# Patient Record
Sex: MALE | URBAN - METROPOLITAN AREA
[De-identification: names, ages, dates, MRNs, and addresses within clinical notes are randomized per-mention and may not be internally consistent; named-entity substitution may affect disease eponyms.]

---

## 2022-07-20 ENCOUNTER — APPOINTMENT (OUTPATIENT)
Dept: URBAN - METROPOLITAN AREA CLINIC 273 | Age: 22
Setting detail: DERMATOLOGY
End: 2022-07-20

## 2022-07-20 DIAGNOSIS — M71 OTHER BURSOPATHIES: ICD-10-CM

## 2022-07-20 PROBLEM — M71.342 OTHER BURSAL CYST, LEFT HAND: Status: ACTIVE | Noted: 2022-07-20

## 2022-07-20 PROCEDURE — OTHER DEFER: OTHER

## 2022-07-20 PROCEDURE — OTHER MIPS QUALITY: OTHER

## 2022-07-20 PROCEDURE — 99202 OFFICE O/P NEW SF 15 MIN: CPT

## 2022-07-20 PROCEDURE — OTHER ADDITIONAL NOTES: OTHER

## 2022-07-20 PROCEDURE — OTHER COUNSELING: OTHER

## 2022-07-20 ASSESSMENT — LOCATION ZONE DERM: LOCATION ZONE: FINGER

## 2022-07-20 ASSESSMENT — LOCATION SIMPLE DESCRIPTION DERM: LOCATION SIMPLE: LEFT INDEX FINGER

## 2022-07-20 ASSESSMENT — LOCATION DETAILED DESCRIPTION DERM: LOCATION DETAILED: LEFT INDEX PROXIMAL INTERPHALANGEAL JOINT

## 2022-07-20 NOTE — PROCEDURE: DEFER
Introduction Text (Please End With A Colon): Requires hand surgeon because cyst sits on joint:
Detail Level: Detailed
Reason To Defer Override: see hand surgeon Dr Donis MOYER

## 2022-07-20 NOTE — PROCEDURE: MIPS QUALITY
Detail Level: Detailed
Quality 226: Preventive Care And Screening: Tobacco Use: Screening And Cessation Intervention: Patient screened for tobacco use, is a smoker AND received Cessation Counseling within the Previous 12 Months
Quality 431: Preventive Care And Screening: Unhealthy Alcohol Use - Screening: Patient not identified as an unhealthy alcohol user when screened for unhealthy alcohol use using a systematic screening method

## 2022-07-20 NOTE — PROCEDURE: ADDITIONAL NOTES
Detail Level: Simple
Additional Notes: No erythema with lesion today. Attempted drainage with 16g sterile needle but unable to drain. Pt advised to call ortho office for further evaluation and removal if desired
Render Risk Assessment In Note?: no

## 2022-07-20 NOTE — HPI: CYST
How Severe Is Your Cyst?: moderate
Is This A New Presentation, Or A Follow-Up?: Cyst
Additional History: Was told by another dermatologist that it is a ganglion cyst and to see a hand surgeon.  Here for a second opinion.